# Patient Record
(demographics unavailable — no encounter records)

---

## 2019-03-11 NOTE — XMS REPORT
Patient Summary Document

 Created on:2019



Patient:BREANNA MONROE

Sex:Female

:1963

External Reference #:[id number]





Demographics







 Address  [address] [place] [address]

 

 Home Phone  [phone]

 

 Email Address  NONE

 

 Preferred Language  Unknown

 

 Marital Status  Unknown

 

 Islam Affiliation  Unknown

 

 Race  Unknown

 

 Additional Race(s)  Unavailable

 

 Ethnic Group  Unknown









Author







 Organization  [facility]

 

 Address  [address] [place] [address] Dr. Tucker [address] [place] [id number]

 

 Phone  [phone]









Care Team Providers







 Name  Role  Phone

 

 Unavailable  Unavailable  Unavailable









Problems

This patient has no known problems.



Allergies, Adverse Reactions, Alerts

This patient has no known allergies or adverse reactions.



Medications

This patient has no known medications.

## 2019-03-12 NOTE — RAD REPORT
EXAM DESCRIPTION:  RAD - Chest Pa And Lat (2 Views) - 3/12/2019 2:37 am

 

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
ack region

 

COMPARISON:  November 2015

 

TECHNIQUE:  PA and lateral views of the chest were obtained.

 

FINDINGS:  The lungs are slightly underinflated. There is focal density in the retrocardiac left base
. In the acute clinical setting this is most likely a small focal pneumonia. Mass lesion is lesser in
 likelihood but not excluded. No failure or volume overload.   Heart size is normal and central vascu
lature is within normal limits.  No pleural effusion or pneumothorax seen.  Focal sclerotic focus is 
seen superimposed on the metaphysis of the right humerus. This was not seen on prior imaging. The kaylen
ure of the patient's carcinoma is not defined to assess risk of a metastatic process. This could be a
 bone infarction. No aortic abnormality.

 

IMPRESSION:  Retrocardiac left base mass versus infiltrate. Correlation is needed with clinical findi
ngs for pneumonia.

 

Repeat imaging can be performed in 4-6 weeks following medical management for an acute respiratory pr
ocess. Alternatively, CT chest imaging could be performed for further clarification.

 

Rounded sclerotic focus proximal right humerus new from prior imaging. Bone infarct would be favored.
 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 Correlation is needed with t
he specific type of cancer to determine if the patient is at risk for bony metastatic disease.

## 2019-03-12 NOTE — RAD REPORT
EXAM DESCRIPTION:  CT - Head Brain Wo Cont - 3/12/2019 2:20 am

 

CLINICAL HISTORY:  Headache

 

COMPARISON:  None.

 

TECHNIQUE:  Axial 5 mm thick images of the head were obtained without IV contrast.

 

All CT scans are performed using dose optimization technique as appropriate and may include automated
 exposure control or mA/KV adjustment according to patient size.

 

FINDINGS:  No intracranial hemorrhage, mass, edema or shift of mid-line structures. No acute infarcti
on changes seen. No abnormal extra-axial fluid collections. Ventricles are normal.

 

Mastoid air cells are clear. There is extensive mucosal thickening throughout the right maxillary sin
us with air-fluid level. Ethmoid and sphenoid sinus mucosal thickening present also with air-fluid le
vels.

 

No acute bony findings.

 

Due to malfunction with the PACs/fluency system only verbal report could be provided at the time of t
he study.

 

IMPRESSION:  No intracranial abnormality identified.

 

Extensive acute sinusitis throughout most of the paranasal sinuses.

## 2019-03-12 NOTE — ER
Nurse's Notes                                                                                     

 [facility]                                                                

Name: Leonor Grace                                                                          

Age: 56 yrs                                                                                       

Sex: Female                                                                                       

: 1963                                                                                   

MRN: [id number]                                                                                   

Arrival Date: 2019                                                                          

Time: 22:13                                                                                       

Account#: [id number]                                                                            

Bed 26                                                                                            

Private MD:                                                                                       

Diagnosis: Chronic Sinusitis;headache;bronchitis;Hyperglycemia, unspecified                       

                                                                                                  

Presentation:                                                                                     

                                                                                             

22:17 Presenting complaint: Patient states: Reports headache and blurry vision that started a ea  

      week ago. Reports feeling tired and has been running fever for the past week. Daughter      

      reports she has the flu and was afraid mother caught it. Reports she has lost 50 lbs in     

      the past month. Transition of care: patient was not received from another setting of        

      care. No acute neurological deficit is noted. Onset of symptoms. Risk Assessment: Do        

      you want to hurt yourself or someone else? Patient reports no desire to harm self or        

      others. Initial Sepsis Screen: Does the patient meet any 2 criteria? No. Patient's          

      initial sepsis screen is negative. Does the patient have a suspected source of              

      infection? No. Patient's initial sepsis screen is negative. Care prior to arrival: None.    

22:17 Method Of Arrival: Ambulatory                                                           ea  

22:17 Acuity: MIGUELANGEL 3                                                                           ea  

                                                                                                  

Triage Assessment:                                                                                

22:24 General: Appears uncomfortable, Behavior is calm, cooperative, appropriate for age.     ea  

      Pain: Complains of pain in body aches. Neuro: Level of Consciousness is awake, alert,       

      obeys commands, Oriented to person, place, time, situation,  are equal bilaterally     

      Moves all extremities. Gait is steady, Speech is normal, Facial symmetry appears            

      normal, Intact Reports blurred vision headache. Respiratory: Airway is patent               

      Respiratory effort is even, unlabored, Respiratory pattern is regular, symmetrical.         

      Derm: Skin is pink, warm \T\ dry.                                                           

                                                                                                  

Historical:                                                                                       

- Allergies:                                                                                      

22:21 PENICILLINS;                                                                            ea  

- Home Meds:                                                                                      

22:21 None [Active];                                                                          ea  

- PMHx:                                                                                           

22:21 Diabetes - IDDM;                                                                        ea  

- PSHx:                                                                                           

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  

                                                                                                  

- Immunization history:: Adult Immunizations up to date.                                          

- Social history:: Smoking status: Patient uses tobacco products, smokes one pack                 

  cigarettes per day. The patient lives with family.                                              

- Ebola Screening: : No symptoms or risks identified at this time.                                

- Family history:: not pertinent.                                                                 

- Hospitalizations: : No recent hospitalization is reported.                                      

                                                                                                  

                                                                                                  

Screenin:21 VAN Screening: Arm Drift: Patient shows no arm weakness. Patient is VAN negative. The   ea  

      patient is alert, able to follow commands. The patient does not exhibit slurred or          

      garbled speech The patient is not exhibiting difficulty speaking. The patient does not      

      exhibit difficulty understanding words. The patient is able to swallow own secretions       

      with no drooling or need for suction. Patient tolerated one teaspoon of water. No           

      drooling, immediate coughing, gurgling, or clearing of the throat was noted. The            

      patient tolerated 90mL of water. No drooling, immediate coughing, gurgling, or clearing     

      of the throat was noted. The patient passed the bedside swallow screening. Oral             

      medications may be given as ordered. Contact Physician for further diet orders.             

                                                                                             

03:18 Abuse screen: Denies threats or abuse. Nutritional screening: No deficits noted.        ea  

      Tuberculosis screening: No symptoms or risk factors identified. Fall Risk None              

      identified.                                                                                 

                                                                                                  

Assessment:                                                                                       

00:10 General: Appears in no apparent distress. Behavior is calm, cooperative, Reports        fu  

      generalized body weakness for a month. Pain: Complains of pain in headache at scale of      

      8/10 Pain does not radiate. Neuro: Level of Consciousness is awake, alert, obeys            

      commands, Oriented to person, place, time, situation,  are equal bilaterally.          

      Cardiovascular: Denies chest pain. Respiratory: Reports cough that is productive, with      

      whitish sputum. Musculoskeletal: No signs and/or symptoms reported regarding the            

      musculoskeletal system.                                                                     

02:00 Reassessment: Patient appears in no apparent distress at this time. Patient and/or      fu  

      family updated on plan of care and expected duration. Pain level reassessed. Patient is     

      alert, oriented x 3, equal unlabored respirations, skin warm/dry/pink.                      

03:25 Reassessment: Patient appears in no apparent distress at this time. Patient and/or      fu  

      family updated on plan of care and expected duration. Pain level reassessed. Patient is     

      alert, oriented x 3, equal unlabored respirations, skin warm/dry/pink. Pain: Complains      

      of pain in head Pain does not radiate. Pain currently is 2 out of 10 on a pain scale.       

04:22 Reassessment: Patient appears in no apparent distress at this time. Patient and/or      fu  

      family updated on plan of care and expected duration. Pain level reassessed. Patient is     

      alert, oriented x 3, equal unlabored respirations, skin warm/dry/pink.                      

05:53 Reassessment: Patient appears in no apparent distress at this time. Patient and/or      fu  

      family updated on plan of care and expected duration. Pain level reassessed. Patient is     

      alert, oriented x 3, equal unlabored respirations, skin warm/dry/pink. Patient states       

      feeling better.                                                                             

                                                                                                  

Vital Signs:                                                                                      

                                                                                             

22:30  / 67; Pulse 96; Resp 18; Temp 97.7; Pulse Ox 100% on R/A; Weight 77.11 kg;       ea  

      Height 5 ft. 4 in. (162.56 cm);                                                             

                                                                                             

01:00  / 70; Pulse 88; Pulse Ox 95% ; Pain 8/10;                                        fu  

01:45  / 57; Pulse 83; Pulse Ox 94% ; Pain 4/10;                                        fu  

03:15  / 65; Pulse 90; Temp 98.2; Pulse Ox 91% ; Pain 2/10;                             fu  

04:00  / 62; Pulse 90; Pulse Ox 96% ; Pain 2/10;                                        fu  

04:58  / 59; Pulse 76; Resp 13; Pulse Ox 94% ;                                          fu  

05:53  / 59; Pulse 69; Resp 13; Pulse Ox 95% ; Pain 0/10;                               fu  

                                                                                             

22:30 Body Mass Index 29.18 (77.11 kg, 162.56 cm)                                               

                                                                                                  

ED Course:                                                                                        

                                                                                             

22:13 Patient arrived in ED.                                                                  am2 

22:19 Triage completed.                                                                         

                                                                                             

01:04 Adal Patterson MD is Attending Physician.                                             wa  

01:10 Vitor Morillo, RN is Primary Nurse.                                                    fu  

01:10 Arm band placed on right wrist. Patient placed in an exam room, on a stretcher, on      ea  

      pulse oximetry.                                                                             

01:45 Inserted saline lock: 20 gauge in left antecubital area, using aseptic technique. Blood fu  

      collected.                                                                                  

02:09 CT Head Brain wo Cont In Process Unspecified.                                           EDMS

02:38 XRAY Chest Pa And Lat (2 Views) In Process Unspecified.                                 EDMS

04:24 Lab(s) recollected, sent to lab.                                                        fu  

04:30 Patient has correct armband on for positive identification. Bed in low position. Call   fu  

      light in reach. Side rails up X2. Cardiac monitor on. Pulse ox on. NIBP on.                 

04:58 No apparent distress. Appears to be sleeping.                                           fu  

05:53 Patient requests rest room assistance.                                                  fu  

06:23 No provider procedures requiring assistance completed. IV discontinued, bleeding        fu  

      controlled.                                                                                 

                                                                                                  

Administered Medications:                                                                         

02:40 Drug: NS 0.9% 1000 ml Route: IV; Rate: 1 bolus; Site: left antecubital;                 fu  

02:40 Drug: Zofran 4 mg Route: IVP; Site: left antecubital;                                   fu  

02:40 Drug: Tylenol 1000 mg Route: PO;                                                        fu  

02:41 Drug: Xopenex 1.25 mg Route: Inhalation;                                                fu  

06:02 CANCELLED (Physician Discretion): Zithromax 500 mg IVPB once over 1 hrs; mix in 250 mL  wa  

      NS                                                                                          

06:12 Drug: Zithromax 500 mg Route: PO;                                                       ea  

06:15 Drug: Rocephin - (cefTRIAXone) 2 grams Route: IVPB; Infused Over: 30 mins; Site: left   fu  

      antecubital;                                                                                

                                                                                                  

                                                                                                  

Point of Care Testing:                                                                            

      Blood Glucose:                                                                              

                                                                                             

22:23 Blood Glucose: 365 mg/dL;                                                               ea  

      Ranges:                                                                                     

                                                                                                  

Outcome:                                                                                          

                                                                                             

06:23 Discharge ordered by MD.                                                                wa  

06:36 Discharged to home ambulatory.                                                          fu  

06:36 Condition: improved                                                                         

06:36 Discharge instructions given to patient, Instructed on discharge instructions, follow       

      up and referral plans. Demonstrated understanding of instructions, follow-up care,          

      medications, Prescriptions given X 4.                                                       

06:45 Patient left the ED.                                                                    fu  

                                                                                                  

Signatures:                                                                                       

Dispatcher [employer]                           EDMS                                                 

Laurie Gonslaez                               am2                                                  

Verito Eng RN RN ea Appiah, William, MD MD wa Umadhay, Felix, RN                      RN   fu                                                   

                                                                                                  

Corrections: (The following items were deleted from the chart)                                    

                                                                                             

22:23 22:17 Presenting complaint: Patient states: Reports she lost 50 lbs in a month. Reports ea  

      headache and blurry vision that started a week ago. ea                                      

                                                                                             

03:21 03:16 General: Appears in no apparent distress. Behavior is calm, cooperative, Reports  fu  

      generalized body weakness for a month. fu                                                   

03:24 03:16 Neuro: Level of Consciousness is awake, alert, obeys commands, Oriented to        fu  

      person, place, time, situation,  are equal bilaterally fu                              

03:24 03:16 Pain: Complains of pain in headache at scale of 8/10 Pain does not radiate. fu    fu  

 03:16 Cardiovascular: Denies chest pain, fu                                             fu  

 03:16 Respiratory: Reports cough that is productive, with whitish sputum. fu            fu  

 03:16 Musculoskeletal: No signs and/or symptoms reported regarding the musculoskeletal  fu  

      system. fu                                                                                  

 03:16 General: Appears in no apparent distress. Behavior is calm, cooperative, Reports  fu  

      generalized body weakness for a month. fu                                                   

                                                                                                  

**************************************************************************************************

## 2019-03-12 NOTE — EDPHYS
Physician Documentation                                                                           

 [facility]                                                                

Name: Leonor Grace                                                                          

Age: 56 yrs                                                                                       

Sex: Female                                                                                       

: 1963                                                                                   

MRN: [id number]                                                                                   

Arrival Date: 2019                                                                          

Time: 22:13                                                                                       

Account#: [id number]                                                                            

Bed 26                                                                                            

Private MD:                                                                                       

ED Physician Adal Patterson                                                                      

HPI:                                                                                              

                                                                                             

03:59 This 56 yrs old  Female presents to ER via Ambulatory with complaints of       wa  

      posterior HA, blurry vision.                                                                

03:59 The patient's problem is reported as c/o HAs at back of head x 1 week. also blurry      wa  

      vision. sinus pain and congestion. also cough x 1 month now with sputum. states feeling     

      listless and cannot play with grand kids any longer. denies chest pain. states SOB when     

      coughing spells. denies abd pain. Onset: The symptoms/episode began/occurred 4 month(s)     

      ago. Duration: The episode is continuous, wax and wane. Context: as noted above. The        

      symptoms are alleviated by nothing. The symptoms are aggravated by nothing. Associated      

      signs and symptoms: Pertinent positives: blurred vision, cough. . Severity of symptoms:     

      At their worst the symptoms were moderate in the emergency department the symptoms are      

      unchanged. Patient's baseline: Neuro: alert and fully oriented, Motor: no deficits,         

      Ambulation: walks without assistance, Speech: normal, The patient has a previous            

      history of diabetes. states controls with diet as cannot afford metformin. The patient      

      has not experienced similar symptoms in the past. The patient has not recently seen a       

      physician.                                                                                  

                                                                                                  

Historical:                                                                                       

- Allergies:                                                                                      

                                                                                             

22:21 PENICILLINS;                                                                            ea  

- Home Meds:                                                                                      

22:21 None [Active];                                                                          ea  

- PMHx:                                                                                           

22:21 Diabetes - IDDM;                                                                        ea  

- PSHx:                                                                                           

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  

                                                                                                  

- Immunization history:: Adult Immunizations up to date.                                          

- Social history:: Smoking status: Patient uses tobacco products, smokes one pack                 

  cigarettes per day. The patient lives with family.                                              

- Ebola Screening: : No symptoms or risks identified at this time.                                

- Family history:: not pertinent.                                                                 

- Hospitalizations: : No recent hospitalization is reported.                                      

                                                                                                  

                                                                                                  

ROS:                                                                                              

                                                                                             

04:04 Constitutional: Negative for fever, chills, and weight loss, Eyes: Negative for injury, wa  

      pain, redness, and discharge, ENT: Negative for injury, pain, and discharge, Neck:          

      Negative for injury, pain, and swelling, Abdomen/GI: Negative for abdominal pain,           

      nausea, vomiting, diarrhea, and constipation, Back: Negative for injury and pain, :       

      Negative for injury, bleeding, discharge, and swelling, MS/Extremity: Negative for          

      injury and deformity, Skin: Negative for injury, rash, and discoloration, Psych:            

      Negative for depression, anxiety, suicide ideation, homicidal ideation, and                 

      hallucinations.                                                                             

      Cardiovascular: Negative for chest pain, edema, orthopnea, palpitations.                    

      Respiratory: Positive for cough, with white sputum.                                         

      Neuro: Positive for headache, Negative for loss of consciousness, syncope.                  

      All other systems are negative.                                                             

                                                                                                  

Exam:                                                                                             

04:04 Radiologist reports: negative for intracranial bleed. noted sinus disease               wa  

04:04 Head/Face:  Normocephalic, atraumatic. Eyes:  Pupils equal round and reactive to light,     

      extra-ocular motions intact.  Lids and lashes normal.  Conjunctiva and sclera are           

      non-icteric and not injected.  Cornea within normal limits.  Periorbital areas with no      

      swelling, redness, or edema. ENT:  Nares patent. No nasal discharge, no septal              

      abnormalities noted.  Tympanic membranes are normal and external auditory canals are        

      clear.  Oropharynx with no redness, swelling, or masses, exudates, or evidence of           

      obstruction, uvula midline.  Mucous membranes moist. Neck:  Trachea midline, no             

      thyromegaly or masses palpated, and no cervical lymphadenopathy.  Supple, full range of     

      motion without nuchal rigidity, or vertebral point tenderness.  No Meningismus.             

      Chest/axilla:  Normal chest wall appearance and motion.  Nontender with no deformity.       

      No lesions are appreciated. Abdomen/GI:  Soft, non-tender, with normal bowel sounds.        

      No distension or tympany.  No guarding or rebound.  No evidence of tenderness               

      throughout. Back:  No spinal tenderness.  No costovertebral tenderness.  Full range of      

      motion. Skin:  Warm, dry with normal turgor.  Normal color with no rashes, no lesions,      

      and no evidence of cellulitis. MS/ Extremity:  Pulses equal, no cyanosis.                   

      Neurovascular intact.  Full, normal range of motion. Psych:  Awake, alert, with             

      orientation to person, place and time.  Behavior, mood, and affect are within normal        

      limits.                                                                                     

04:04 Cardiovascular: Rate: normal, Rhythm: regular, Pulses: no pulse deficits are                

      appreciated, Heart sounds: normal, Edema: is not appreciated, JVD: is not appreciated.      

04:04 Respiratory: the patient does not display signs of respiratory distress,  Respirations:     

      normal, Breath sounds: wheezing: is scattered, Respiratory rate:  normal                    

04:04 Neuro: Orientation: is normal, Mentation: is normal, Cranial nerves: grossly normal,        

      Cerebellar function: is grossly normal, Motor: is normal.                                   

                                                                                                  

Vital Signs:                                                                                      

                                                                                             

22:30  / 67; Pulse 96; Resp 18; Temp 97.7; Pulse Ox 100% on R/A; Weight 77.11 kg;       ea  

      Height 5 ft. 4 in. (162.56 cm);                                                             

                                                                                             

01:00  / 70; Pulse 88; Pulse Ox 95% ; Pain 8/10;                                        fu  

01:45  / 57; Pulse 83; Pulse Ox 94% ; Pain 4/10;                                        fu  

03:15  / 65; Pulse 90; Temp 98.2; Pulse Ox 91% ; Pain 2/10;                             fu  

04:00  / 62; Pulse 90; Pulse Ox 96% ; Pain 2/10;                                        fu  

04:58  / 59; Pulse 76; Resp 13; Pulse Ox 94% ;                                          fu  

05:53  / 59; Pulse 69; Resp 13; Pulse Ox 95% ; Pain 0/10;                               fu  

                                                                                             

22:30 Body Mass Index 29.18 (77.11 kg, 162.56 cm)                                               

                                                                                                  

MDM:                                                                                              

01:04 Patient medically screened.                                                             wa  

04:06 Differential diagnosis: CVA, sinusitis? check labs, CT CXR, r/o pna. Data reviewed:     wa  

      vital signs, nurses notes.                                                                  

06:20 Test interpretation: by ED physician or midlevel provider: labs: glucose elevated at    wa  

      412. wbc 15.4.                                                                              

06:21 Test interpretation: by ED physician or midlevel provider: head CT: no acute bleed.     wa  

      noted pan sinus disease.                                                                    

06:28 Test interpretation: by ED physician or midlevel provider: EKG: HR 85. noted            wa  

      inferior-lateral ST depressions. . Response to treatment: the patient's symptoms have       

      markedly improved after treatment.                                                          

                                                                                                  

                                                                                             

22:24 Order name: Flu; Complete Time: 01:06                                                   ea  

                                                                                             

01:40 Order name: BMP; Complete Time: 05:58                                                   wa  

                                                                                             

01:40 Order name: CBC with Diff; Complete Time: 05:58                                         wa  

                                                                                             

01:40 Order name: Hepatic Function; Complete Time: 05:58                                      wa  

                                                                                             

01:40 Order name: Lipase; Complete Time: 05:58                                                wa  

                                                                                             

01:40 Order name: Magnesium; Complete Time: 05:58                                             wa  

                                                                                             

01:40 Order name: XRAY Chest Pa And Lat (2 Views)                                                                                                                                          

01:40 Order name: NT PRO-BNP; Complete Time: 05:58                                            wa  

                                                                                             

01:40 Order name: PT-INR; Complete Time: 05:58                                                wa  

                                                                                             

01:40 Order name: Troponin (emerg Dept Use Only); Complete Time: 05:58                        wa  

                                                                                             

01:41 Order name: CT Head Brain wo Cont                                                                                                                                                    

01:40 Order name: EKG; Complete Time: 01:40                                                   wa  

                                                                                             

01:40 Order name: Cardiac monitoring; Complete Time: 03:13                                    wa  

                                                                                             

01:40 Order name: EKG - Nurse/Tech; Complete Time: 03:13                                      wa  

                                                                                             

01:40 Order name: IV Saline Lock; Complete Time: 02:49                                        wa  

                                                                                             

01:40 Order name: Labs collected and sent; Complete Time: 03:13                                                                                                                            

01:40 Order name: O2 Per Protocol; Complete Time: 03:13                                                                                                                                    

01:40 Order name: O2 Sat Monitoring; Complete Time: 03:                                     wa  

                                                                                             

04:11 Order name: Labs - recollect needed; Complete Time: 04:29                               ms  

                                                                                                  

Administered Medications:                                                                         

02:40 Drug: NS 0.9% 1000 ml Route: IV; Rate: 1 bolus; Site: left antecubital;                 fu  

02:40 Drug: Zofran 4 mg Route: IVP; Site: left antecubital;                                   fu  

02:40 Drug: Tylenol 1000 mg Route: PO;                                                        fu  

02:41 Drug: Xopenex 1.25 mg Route: Inhalation;                                                fu  

06:02 CANCELLED (Physician Discretion): Zithromax 500 mg IVPB once over 1 hrs; mix in 250 mL  wa  

      NS                                                                                          

06:12 Drug: Zithromax 500 mg Route: PO;                                                       ea  

06:15 Drug: Rocephin - (cefTRIAXone) 2 grams Route: IVPB; Infused Over: 30 mins; Site: left   fu  

      antecubital;                                                                                

                                                                                                  

                                                                                                  

Point of Care Testing:                                                                            

      Blood Glucose:                                                                              

                                                                                             

22:23 Blood Glucose: 365 mg/dL;                                                               ea  

      Ranges:                                                                                     

      Critical Glucose Levels:Adult <50 mg/dl or >400 mg/dl  <40 mg/dl or >180 mg/dl       

Disposition:                                                                                      

19 06:23 Discharged to Home. Impression: Chronic Sinusitis, headache, bronchitis,           

  Hyperglycemia, unspecified.                                                                     

- Condition is Stable.                                                                            

- Discharge Instructions: Sinusitis, Adult, Easy-to-Read, Acute Bronchitis,                       

  Easy-to-Read.                                                                                   

- Prescriptions for Zithromax Z- Randy 250 mg Oral Tablet - take 1 tablet by ORAL route             

  as directed for 5 days Day 1 - take two (2) tablets one time. Day 2, 3, 4 , 5 take              

  one (1) tablet once daily.; 6 tablet. Albuterol Sulfate 90 mcg/actuation - inhale 1-2           

  puff by INHALATION route every 4-6 hours; 1 Inhaler. Diflucan 150 mg Oral Tablet -              

  take 1 tablet by ORAL route one time for 1 day; 1 tablet. Metformin 500 mg Oral                 

  Tablet - take 1 tablet by ORAL route 2 times per day for 30 days Then take 1 tablet             

  with morning meals AND evening meals; 60 tablet.                                                

- Medication Reconciliation Form, Thank You Letter, Antibiotic Education, Prescription            

  Opioid Use form.                                                                                

- Follow up: Private Physician; When: 2 - 3 days; Reason: Recheck today's complaints.             

- Problem is new.                                                                                 

- Symptoms have improved.                                                                         

                                                                                                  

                                                                                                  

                                                                                                  

Signatures:                                                                                       

Dispatcher [employer]                           EDMS                                                 

Sandra Mercado ms, Elena, RN RN ea [place], William, MD MD wa Umadhay, Felix, RN RN fu                                                   

                                                                                                  

Corrections: (The following items were deleted from the chart)                                    

                                                                                             

06:02 06:01 Zithromax 500 mg IVPB once over 1 hrs; mix in 250 mL NS ordered. [facility]  

06:26 06:23 2019 06:23 Discharged to Home. Impression: Chronic Sinusitis; headache;     wa  

      bronchitis. Condition is Stable. Forms are Medication Reconciliation Form, Thank You        

      Letter, Antibiotic Education, Prescription Opioid Use. Follow up: Private Physician;        

      When: 2 - 3 days; Reason: Recheck today's complaints. Problem is new. Symptoms have         

      improved. wa                                                                                

06:45 06:26 2019 06:23 Discharged to Home. Impression: Chronic Sinusitis; headache;     fu  

      bronchitis; Hyperglycemia, unspecified. Condition is Stable. Discharge Instructions:        

      Sinusitis, Adult, Easy-to-Read, Acute Bronchitis, Easy-to-Read. Prescriptions for           

      Zithromax Z-Randy 250 mg Oral Tablet - take 1 tablet by ORAL route as directed for 5 days     

      Day 1 - take two (2) tablets one time. Day 2, 3, 4 , 5 take one (1) tablet once daily.;     

      6 tablet, Albuterol Sulfate 90 mcg/actuation - inhale 1-2 puff by INHALATION route          

      every 4-6 hours; 1 Inhaler, Diflucan 150 mg Oral Tablet - take 1 tablet by ORAL route       

      one time for 1 day; 1 tablet, Metformin 500 mg Oral Tablet - take 1 tablet by ORAL          

      route 2 times per day for 30 days Then take 1 tablet with morning meals AND evening         

      meals; 60 tablet. and Forms are Medication Reconciliation Form, Thank You Letter,           

      Antibiotic Education, Prescription Opioid Use. Follow up: Private Physician; When: 2 -      

      3 days; Reason: Recheck today's complaints. Problem is new. Symptoms have improved. wa      

                                                                                                  

**************************************************************************************************

## 2019-03-14 NOTE — EKG
Test Date:    2019-03-12               Test Time:    03:00:44

Technician:   SHAUNA                                     

                                                     

MEASUREMENT RESULTS:                                       

Intervals:                                           

Rate:         85                                     

OH:           146                                    

QRSD:         78                                     

QT:           352                                    

QTc:          418                                    

Axis:                                                

P:            72                                     

OH:           146                                    

QRS:          71                                     

T:            -42                                    

                                                     

INTERPRETIVE STATEMENTS:                                       

                                                     

Normal sinus rhythm

T wave abnormality, consider inferior ischemia

Abnormal ECG

Compared to ECG 11/09/2015 10:38:38

T-wave abnormality now present

Possible ischemia now present

ST (T wave) deviation no longer present



Electronically Signed On 03-12-19 12:40:20 CDT by Rene Minor

## 2019-06-10 NOTE — EDPHYS
Physician Documentation                                                                           

 [facility]                                                                 

Name: Leonor Grace                                                                          

Age: 56 yrs                                                                                       

Sex: Female                                                                                       

: 1963                                                                                   

MRN: [id number]                                                                                   

Arrival Date: 06/10/2019                                                                          

Time: 10:17                                                                                       

Account#: [id number]                                                                            

Bed 7                                                                                             

Private MD:                                                                                       

ED Physician Benny Durham                                                                      

HPI:                                                                                              

06/10                                                                                             

10:57 This 56 yrs old  Female presents to ER via Ambulatory with complaints of Chest ps1 

      Pain, Breathing Difficulty.                                                                 

10:57 patient has a history of DM and Tobacco use. States that she started having chest pain  ps1 

      yesterday that is localized substernal with radiation to back. Describes it as burning      

      indigestion like and has taken antacids without relief. Pain is moderate. In waves. No      

      SOB. No diaphoresis. No cards eval previously. .                                            

                                                                                                  

Historical:                                                                                       

- Allergies:                                                                                      

10:24 PENICILLINS;                                                                            aj  

- Home Meds:                                                                                      

13:41 metformin 500 mg Oral tab 1 tab 2 times per day [Active];                               ph  

- PSHx:                                                                                           

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  

                                                                                                  

- Immunization history:: Adult Immunizations unknown.                                             

- Social history:: Smoking status: Patient uses tobacco products, smokes one-half pack            

  cigarettes per day.                                                                             

- Ebola Screening: : No symptoms or risks identified at this time.                                

                                                                                                  

                                                                                                  

ROS:                                                                                              

10:57 Constitutional: Negative for fever, chills, and weight loss, Eyes: Negative for injury, ps1 

      pain, redness, and discharge, ENT: Negative for injury, pain, and discharge,                

      Respiratory: Negative for shortness of breath, cough, wheezing, and pleuritic chest         

      pain, Abdomen/GI: Negative for abdominal pain, nausea, vomiting, diarrhea, and              

      constipation, MS/Extremity: Negative for injury and deformity, Skin: Negative for           

      injury, rash, and discoloration, Neuro: Negative for headache, weakness, numbness,          

      tingling, and seizure.                                                                      

10:57 Cardiovascular: Positive for chest pain.                                                    

                                                                                                  

Exam:                                                                                             

10:57 Constitutional:  This is a well developed, well nourished patient who is awake, alert,  ps1 

      and in no acute distress. Head/Face:  Normocephalic, atraumatic. Eyes:  Pupils equal        

      round and reactive to light, extra-ocular motions intact.  Lids and lashes normal.          

      Conjunctiva and sclera are non-icteric and not injected. ENT:  Nares patent. No nasal       

      discharge, no septal abnormalities noted.  Tympanic membranes are normal and external       

      auditory canals are clear.  Oropharynx with no redness, swelling, or masses, exudates,      

      or evidence of obstruction, uvula midline.  Mucous membranes moist. Chest/axilla:           

      Normal chest wall appearance and motion.  Nontender with no deformity.  No lesions are      

      appreciated. Cardiovascular:  Regular rate and rhythm.  No gallops, murmurs, or rubs.       

      Normal PMI, no JVD.  No pulse deficits. Respiratory:  Lungs have equal breath sounds        

      bilaterally, clear to auscultation and percussion.  No rales, rhonchi or wheezes noted.     

       No increased work of breathing, no retractions or nasal flaring. Abdomen/GI:  Soft,        

      non-tender, with normal bowel sounds.  No distension or tympany.  No guarding or            

      rebound.  No evidence of tenderness throughout. Skin:  Warm, dry with normal turgor.        

      Normal color with no rashes, no lesions, and no evidence of cellulitis. MS/ Extremity:      

      Pulses equal, no cyanosis.  Neurovascular intact.  Full, normal range of motion. Neuro:     

       Awake and alert, GCS 15, oriented to person, place, time, and situation.  Cranial          

      nerves II-XII grossly intact. Sensory grossly intact.                                       

                                                                                                  

Vital Signs:                                                                                      

10:24  / 80; Pulse 100; Resp 20; Temp 97.8; Pulse Ox 99% on R/A; Weight 69.85 kg;       aj  

      Height 5 ft. 4 in. (162.56 cm);                                                             

11:45  / 86; Pulse 86; Resp 18; Pulse Ox 98% on R/A;                                    ph  

12:45  / 72; Pulse 87; Resp 18; Pulse Ox 99% on R/A;                                    ph  

13:42  / 65; Pulse 89; Resp 18; Pulse Ox 99% on R/A;                                    ph  

15:19  / 78; Pulse 82; Resp 18; Temp 97.5; Pulse Ox 99% on R/A;                         ph  

10:24 Body Mass Index 26.43 (69.85 kg, 162.56 cm)                                             aj  

                                                                                                  

MDM:                                                                                              

10:38 Patient medically screened.                                                             ps1 

13:27 HEART Score: History: Moderately Suspicious (1), ECG: Significant ST-deviation (2),     ps1 

      Age: > 45 and < 65 years (1), Risk Factors: 1 or 2 risk factors (1), Troponin: < or = 1     

      x Normal Limit (0). Data reviewed: vital signs, nurses notes, lab test result(s), EKG,      

      radiologic studies, and as a result, I will continue to observe the patient.                

                                                                                                  

06/10                                                                                             

10:39 Order name: CBC with Diff                                                               ps1 

06/10                                                                                             

10:39 Order name: Magnesium                                                                   ps1 

06/10                                                                                             

10:39 Order name: NT PRO-BNP                                                                  ps1 

06/10                                                                                             

10:39 Order name: Troponin (emerg Dept Use Only)                                              ps1 

06/10                                                                                             

10:39 Order name: CMP                                                                         ps1 

06/10                                                                                             

12:59 Order name: CBC with Automated Diff                                                     EDMS

06/10                                                                                             

10:39 Order name: XRAY Chest (1 view); Complete Time: 13:16                                   ps1 

06/10                                                                                             

13:00 Order name: Comprehensive Metabolic Panel                                               EDMS

06/10                                                                                             

13:00 Order name: Troponin (Emerg Dept Use Only)                                              EDMS

06/10                                                                                             

13:00 Order name: NT PRO-BNP                                                                  EDMS

06/10                                                                                             

13:00 Order name: Magnesium                                                                   EDMS

06/10                                                                                             

10:39 Order name: EKG; Complete Time: 12:55                                                   ps1 

06/10                                                                                             

10:39 Order name: Cardiac monitoring; Complete Time: 11:21                                    ps1 

06/10                                                                                             

10:39 Order name: EKG - Nurse/Tech; Complete Time: 11:21                                      ps1 

06/10                                                                                             

10:39 Order name: IV Saline Lock; Complete Time: 11:31                                        ps1 

06/10                                                                                             

10:39 Order name: Labs collected and sent; Complete Time: 11:31                               ps1 

06/10                                                                                             

10:39 Order name: O2 Per Protocol; Complete Time: 11:31                                       ps1 

06/10                                                                                             

10:39 Order name: O2 Sat Monitoring; Complete Time: 11:30                                     ps1 

                                                                                                  

ECG:                                                                                              

10:29 Rate is 97 beats/min. Rhythm is regular. QRS Axis is Normal. DC interval is normal. QRS ps1 

      interval is normal. QT interval is normal. No Q waves. T waves are Inverted. ST Segment     

      is depressed in leads V4, V5, V6.                                                           

                                                                                                  

Administered Medications:                                                                         

11:03 Drug: Zofran 4 mg Route: IVP; Site: left antecubital;                                   ph  

14:20 Follow up: Response: No adverse reaction                                                ph  

11:05 Drug: morphine 4 mg Route: IVP; Site: left antecubital;                                 ph  

11:20 Follow up: Response: No adverse reaction; Pain is decreased                             ph  

13:32 Drug: GI Cocktail without Donnatal - (Maalox Suspension 30 ml, Lidocaine Liquid 2 % 15  ph  

      ml) Route: PO;                                                                              

14:19 Follow up: Response: No adverse reaction                                                ph  

                                                                                                  

                                                                                                  

Disposition:                                                                                      

06/10/19 13:26 Hospitalization ordered by Zion Estes for Observation. Preliminary             

  diagnosis are Chest pain, unspecified, Abnormal electrocardiogram [ECG] [EKG].                  

- Bed requested for Telemetry/MedSurg (observation).                                              

- Status is Observation.                                                                      sg  

- Condition is Stable.                                                                            

- Problem is new.                                                                                 

- Symptoms are unchanged.                                                                         

UTI on Admission? No                                                                              

                                                                                                  

                                                                                                  

                                                                                                  

Signatures:                                                                                       

Dispatcher [employer]                           EDMS                                                 

Marlyn Lara Diana RN                        Deepak Andersen RN RN sg Myers, Amanda, RN RN aj Hall, Patricia, RN RN ph Singer, Phillip, MD MD   ps1                                                  

                                                                                                  

Corrections: (The following items were deleted from the chart)                                    

13:24 13:21 06/10/2019 13:21 Discharged to Home. Impression: Chest pain, unspecified;         ps1 

      Gastro-esophageal reflux disease with esophagitis. Condition is Stable. Forms are           

      Medication Reconciliation Form, Thank You Letter, Antibiotic Education, Prescription        

      Opioid Use. Follow up: Rene Minor; When: Upon discharge from the Emergency                

      Department; Reason: Further diagnostic work-up, Recheck today's complaints, Continuance     

      of care. Follow up: Emergency Department; When: As needed; Reason: Trouble breathing,       

      Worsening of condition. Problem is new. Symptoms have improved. ps1                         

14:51 13:26 Hospitalization Ordered by Zion Estes DO for Observation. Preliminary          bd  

      diagnosis is Chest pain, unspecified; Abnormal electrocardiogram [ECG] [EKG]. Bed           

      requested for Telemetry/MedSurg (observation). Status is Observation. Condition is          

      Stable. Problem is new. Symptoms are unchanged. UTI on Admission? No. ps1                   

15:06 14:51 06/10/2019 13:26 Hospitalization Ordered by Zion Estes DO for Observation.     dw  

      Preliminary diagnosis is Chest pain, unspecified; Abnormal electrocardiogram [ECG]          

      [EKG]. Bed requested for Telemetry/MedSurg (observation). Status is Observation.            

      Condition is Stable. Problem is new. Symptoms are unchanged. UTI on Admission? No. bd       

16:18 15:06 06/10/2019 13:26 Hospitalization Ordered by Zion Prezas DO for Observation.     sg  

      Preliminary diagnosis is Chest pain, unspecified; Abnormal electrocardiogram [ECG]          

      [EKG]. Bed requested for Telemetry/MedSurg (observation). Status is Observation.            

      Condition is Stable. Problem is new. Symptoms are unchanged. UTI on Admission? No. dw       

                                                                                                  

**************************************************************************************************
None

## 2019-06-10 NOTE — RAD REPORT
EXAM DESCRIPTION:  Srikanth Single View6/10/2019 11:48 am

 

CLINICAL HISTORY:  Chest pain

 

COMPARISON:  March 2019

 

FINDINGS:   The lungs appear clear of acute infiltrate. The heart is normal size

 

IMPRESSION:   No acute abnormalities displayed

## 2019-06-10 NOTE — EKG
Test Date:    2019-06-10               Test Time:    10:29:56

Technician:   MATTHIAS                                     

                                                     

MEASUREMENT RESULTS:                                       

Intervals:                                           

Rate:         97                                     

VT:           124                                    

QRSD:         86                                     

QT:           346                                    

QTc:          439                                    

Axis:                                                

P:            60                                     

VT:           124                                    

QRS:          64                                     

T:            -7                                     

                                                     

INTERPRETIVE STATEMENTS:                                       

                                                     

Normal sinus rhythm

Right atrial enlargement

ST abnormality, non specific

Abnormal ECG

Compared to ECG 03/12/2019 03:00:44

Atrial abnormality now present



Electronically Signed On 06-10-19 17:18:28 CDT by Rene Minor

## 2019-06-10 NOTE — ER
Nurse's Notes                                                                                     

 [facility]                                                                 

Name: Leonor Grace                                                                          

Age: 56 yrs                                                                                       

Sex: Female                                                                                       

: 1963                                                                                   

MRN: [id number]                                                                                   

Arrival Date: 06/10/2019                                                                          

Time: 10:17                                                                                       

Account#: [id number]                                                                            

Bed 7                                                                                             

Private MD:                                                                                       

Diagnosis: Chest pain, unspecified;Abnormal electrocardiogram [ECG] [EKG]                         

                                                                                                  

Presentation:                                                                                     

06/10                                                                                             

10:23 Presenting complaint: Patient states: Epigastric pain with SOB that started last night. aj  

      Patient is in NAD at this time. Transition of care: patient was not received from           

      another setting of care. Onset of symptoms was Cydney 10, 2019. Risk Assessment: Do you       

      want to hurt yourself or someone else? Patient reports no desire to harm self or            

      others. Initial Sepsis Screen: Does the patient meet any 2 criteria? No. Patient's          

      initial sepsis screen is negative. Does the patient have a suspected source of              

      infection? No. Patient's initial sepsis screen is negative. Care prior to arrival: None.    

10:23 Method Of Arrival: Ambulatory                                                             

10:23 Acuity: MIGUELANGEL 2                                                                           aj  

                                                                                                  

Triage Assessment:                                                                                

10:24 General: Appears in no apparent distress. uncomfortable, Behavior is calm, cooperative, aj  

      appropriate for age. Pain: Complains of pain in diaphragm, xyphoid area and epigastric      

      area. Neuro: Level of Consciousness is awake, alert, obeys commands, Oriented to            

      person, place, time, situation, Appropriate for age. Cardiovascular: Reports chest          

      pain, Capillary refill < 3 seconds in bilateral fingers. Respiratory: Reports shortness     

      of breath. Derm: Skin is intact, is healthy with good turgor, Skin is pink, warm \T\ dry.   

      normal.                                                                                     

                                                                                                  

Historical:                                                                                       

- Allergies:                                                                                      

10:24 PENICILLINS;                                                                            aj  

- Home Meds:                                                                                      

13:41 metformin 500 mg Oral tab 1 tab 2 times per day [Active];                               ph  

- PSHx:                                                                                           

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  

                                                                                                  

- Immunization history:: Adult Immunizations unknown.                                             

- Social history:: Smoking status: Patient uses tobacco products, smokes one-half pack            

  cigarettes per day.                                                                             

- Ebola Screening: : No symptoms or risks identified at this time.                                

                                                                                                  

                                                                                                  

Screenin:34 Abuse screen: Denies threats or abuse. Denies injuries from another. Nutritional        ph  

      screening: No deficits noted. Tuberculosis screening: No symptoms or risk factors           

      identified. Fall Risk None identified.                                                      

                                                                                                  

Assessment:                                                                                       

11:47 General: Appears in no apparent distress. uncomfortable, Behavior is calm, cooperative, ph  

      appropriate for age, Denies fever, feeling ill. Pain: Complains of pain in xyphoid area     

      and mid-sternal area Pain radiates to diaphragm and back Quality of pain is described       

      as burning, sharp, Pain began 1 day ago. Neuro: Level of Consciousness is awake, alert,     

      obeys commands, Oriented to person, place, time, situation. Cardiovascular: Reports         

      chest pain, nausea, Denies palpitations, shortness of breath, syncope, vomiting.            

      Respiratory: Airway is patent Respiratory effort is even, unlabored, Respiratory            

      pattern is regular, symmetrical. GI: Abdomen is round non-distended, Reports epigastric     

      pain, nausea, Patient currently denies diarrhea, vomiting. Derm: Skin is intact, is         

      healthy with good turgor, Skin is pink, warm \T\ dry. Musculoskeletal: Circulation,         

      motion, and sensation intact. Range of motion: intact in all extremities.                   

11:53 Reassessment: Patient appears in no apparent distress at this time. Patient and/or      ph  

      family updated on plan of care and expected duration. Pain level reassessed. Patient is     

      alert, oriented x 3, equal unlabored respirations, skin warm/dry/pink. Pt reports that      

      pain has decreased after IV pain medication, VSS, awaiting lab results.                     

13:15 Reassessment: Patient appears in no apparent distress at this time. Patient and/or      ph  

      family updated on plan of care and expected duration. Pain level reassessed. Patient is     

      alert, oriented x 3, equal unlabored respirations, skin warm/dry/pink. Pt reports that      

      pain has returned, denies nausea, see MAR.                                                  

13:43 Reassessment: Patient appears in no apparent distress at this time. Dr Estes at          

      bedside to speak w/ pt.                                                                     

15:15 Reassessment: Patient appears in no apparent distress at this time. Patient and/or      ph  

      family updated on plan of care and expected duration. Pain level reassessed. Patient is     

      alert, oriented x 3, equal unlabored respirations, skin warm/dry/pink. Attempted to         

      call report to 2nd floor, receiving nurse unavailable.                                      

                                                                                                  

Vital Signs:                                                                                      

10:24  / 80; Pulse 100; Resp 20; Temp 97.8; Pulse Ox 99% on R/A; Weight 69.85 kg;       aj  

      Height 5 ft. 4 in. (162.56 cm);                                                             

11:45  / 86; Pulse 86; Resp 18; Pulse Ox 98% on R/A;                                    ph  

12:45  / 72; Pulse 87; Resp 18; Pulse Ox 99% on R/A;                                    ph  

13:42  / 65; Pulse 89; Resp 18; Pulse Ox 99% on R/A;                                    ph  

15:19  / 78; Pulse 82; Resp 18; Temp 97.5; Pulse Ox 99% on R/A;                         ph  

10:24 Body Mass Index 26.43 (69.85 kg, 162.56 cm)                                             aj  

                                                                                                  

ED Course:                                                                                        

10:17 Patient arrived in ED.                                                                  tw3 

10:24 Triage completed.                                                                       aj  

10:24 Arm band placed on left wrist.                                                          aj  

10:34 Benny Durham MD is Attending Physician.                                             ps1 

10:50 Initial lab(s) drawn, by me, sent to lab. Inserted saline lock: 20 gauge in left        ph  

      antecubital area, using aseptic technique. Blood collected.                                 

10:53 EKG done, by EKG tech. reviewed by Benny Durham MD.                                   at1 

11:16 Laverne Bethea, RN is Primary Nurse.                                                    ph  

11:35 Patient maintains SpO2 saturation greater than 95% on room air.                         ph  

11:36 Patient has correct armband on for positive identification. Placed in gown. Bed in low  ph  

      position. Call light in reach. Side rails up X2. Cardiac monitor on. Pulse ox on. NIBP      

      on. Door closed. Noise minimized. Warm blanket given. Pillow given. Head of bed             

      elevated.                                                                                   

11:45 X-ray completed. Portable x-ray completed in exam room. Patient tolerated procedure     jb2 

      well.                                                                                       

13:07 XRAY Chest (1 view) In Process Unspecified.                                             EDMS

13:20 Rene Minor MD is Referral Physician.                                                ps1 

13:26 Zion Estes DO is Hospitalizing Provider.                                           ps1 

13:39 No provider procedures requiring assistance completed. Patient admitted, IV remains in  ph  

      place.                                                                                      

                                                                                                  

Administered Medications:                                                                         

11:03 Drug: Zofran 4 mg Route: IVP; Site: left antecubital;                                   ph  

14:20 Follow up: Response: No adverse reaction                                                ph  

11:05 Drug: morphine 4 mg Route: IVP; Site: left antecubital;                                 ph  

11:20 Follow up: Response: No adverse reaction; Pain is decreased                             ph  

13:32 Drug: GI Cocktail without Donnatal - (Maalox Suspension 30 ml, Lidocaine Liquid 2 % 15  ph  

      ml) Route: PO;                                                                              

14:19 Follow up: Response: No adverse reaction                                                ph  

                                                                                                  

                                                                                                  

Outcome:                                                                                          

13:21 Discharge ordered by MD.                                                                ps1 

13:26 Decision to Hospitalize by Provider.                                                    ps1 

16:18 Admitted to Tele accompanied by tech, via wheelchair, room 224, with chart, Report      sg  

      called to  Tatyana LUIS                                                                       

16:18 Condition: stable                                                                           

16:18 Instructed on the need for admit, safety practices, Demonstrated understanding of           

      instructions.                                                                               

16:18 Patient left the ED.                                                                    sg  

                                                                                                  

Signatures:                                                                                       

Dispatcher [employer]                           EDMS                                                 

Deepak Horan RN RN sg Myers, Amanda, RN RN aj Buechter, Jesse                              jb2                                                  

Laurie Singh, EKG Tech              EKG Tat1                                                  

Laverne Bethea RN RN ph Wade, Tia                                    tw3                                                  

Benny Durham MD MD   ps1                                                  

                                                                                                  

**************************************************************************************************

## 2019-06-10 NOTE — P.HP
Certification for Inpatient


Patient admitted to: Observation


With expected LOS: <2 Midnights


Patient will require the following post-hospital care: None


Practitioner: I am a practitioner with admitting privileges, knowledge of 

patient current condition, hospital course, and medical plan of care.


Services: Services provided to patient in accordance with Admission 

requirements found in Title 42 Section 412.3 of the Code of Federal Regulations





Patient History


Date of Service: 06/10/19


Primary Care Provider: none; Endocrine-Dr. Ruelas


Reason for admission: Chest pain


History of Present Illness: 





56-year-old  female presented to the emergency room with substernal 

chest pain.





Patient reported substernal chest pain.  It radiated to the top of her abdomen 

bilateral.  It was associated with some nausea and shortness of breath.  No 

vomiting noted.  She rated the pain about a 10/10.  She thought it was heartburn

, took medication without relief.  Patient with history of diabetes and tobacco 

use.  The patient came to the ER for further evaluation.





In the ER patient evaluated.  Blood pressure slightly elevated.  EKG showed 

some ST depression to the lateral leads.  Chest x-ray unremarkable.  Initial 

troponin unremarkable.  BNP at 667. Sodium 136, potassium 3.9, BUN of 0.9 with 

a GFR 62.  Patient was admitted for further evaluation.  Heart score-4





When I saw the patient ER, she was without any significant chest pain.  She was 

given morphine in the emergency room.  Patient still smokes about a pack per 

day.  She is trying to quit.  She takes medication for diabetes.  No family 

history of heart disease.


Home medications list reviewed: Yes





- Past Medical/Surgical History


Diabetic: Yes


-: Diabetes mellitus type 2


-: Tobacco use


-: Hysterectomy


Psychosocial/ Personal History: Patient lives at home.





- Family History


Family History: Reviewed- Non-Contributory





- Social History


Smoking Status: Heavy Tobacco smoker (>10 cigarettes/day)


Counseled patient to stop smoking for: less than 10 minutes


Smoking therapy provided: Yes


Patient receptive to therapy: Yes


Alcohol use: No


CD- Drugs: No


Caffeine use: Yes


Place of Residence: Home





Review of Systems


General: As per HPI


Eyes: Unremarkable


ENT: Unremarkable


Respiratory: Shortness of Breath, As per HPI


Cardiovascular: Chest Pain, As per HPI


Gastrointestinal: Nausea, As per HPI


Genitourinary: Unremarkable


Musculoskeletal: Unremarkable


Integumentary: Unremarkable


Neurological: Unremarkable


Lymphatics: Unremarkable





Physical Examination





- Physical Exam


General: Alert, In no apparent distress, Oriented x3, Cooperative


HEENT: Atraumatic, Normocephalic, PERRLA, Mucous membr. moist/pink


Neck: Supple, No Thyromegaly


Respiratory: Clear to auscultation bilaterally


Cardiovascular: Normal pulses, Regular rate/rhythm


Gastrointestinal: Normal bowel sounds, Soft and benign, Non-distended, No 

tenderness, No masses, No rebound, No guarding


Musculoskeletal: No contractures, No erythema, No tenderness, No warmth


Integumentary: No tenderness/swelling, No erythema, No warmth, No cyanosis


Neurological: Normal speech, Normal strength at 5/5 x4 extr, Normal tone, 

Normal affect





- Studies


Laboratory Data (last 24 hrs)





06/10/19 10:53: Sodium 136, Potassium 3.9, BUN 15, Creatinine 0.93, Glucose 260 

H, Magnesium 1.9, Total Bilirubin 0.3, AST 33, ALT 62, Alkaline Phosphatase 124 

H


06/10/19 10:53: WBC 11.9 H, Hgb 15.8 H, Hct 46.3 H, Plt Count 325








Assessment and Plan





- Plan





Impression:


Chest pain with risk factors for heart disease


Diabetes mellitus type 2


Hypertension


Tobacco abuse





Plan:


Chest pain with risk factors for heart disease:  Patient will be admitted for 

further evaluation.  Will monitor cardiac enzymes and telemetry.  Will order 

echocardiogram to further evaluate.  Cardiology has been consulted to further 

evaluate.  Await recommendations.  Will keep the patient NPO after midnight as 

the patient may require cardiac evaluation.  Will start aspirin, DVT prophylaxis

-Lovenox and Lipitor.  Will start metoprolol for blood pressure control.  

Continue to reassess.  If workup unremarkable patient may be able to be 

discharged as early as tomorrow.  Patient remains in observation.


Diabetes mellitus type 2:  Will provide insulin sliding scale.  Will monitor 

closely.  Will hold metformin at this time.


Hypertension:  Blood pressure slightly elevated.  Will start metoprolol.


Tobacco abuse:  Will continue with tobacco cessation education.  Will provide 

nicotine patch as needed.


Discharge Plan: Home


Plan to discharge in: 24 Hours





- Advance Directives


Does patient have a Living Will: No


Does patient have a Durable POA for Healthcare: No





- Code Status/Comfort Care


Code Status Assessed: Yes (Patient full code.)


Time Spent Managing Pts Care (In Minutes): 55

## 2019-06-10 NOTE — XMS REPORT
Patient Summary Document

 Created on:Cydney 10, 2019



Patient:BREANNA MONROE

Sex:Female

:1963

External Reference #:[id number]





Demographics







 Address  [address]



   [place] [address]

 

 Home Phone  [phone]

 

 Email Address  NONE

 

 Preferred Language  Unknown

 

 Marital Status  Unknown

 

 Christianity Affiliation  Unknown

 

 Race  Unknown

 

 Additional Race(s)  Unavailable

 

 Ethnic Group  Unknown









Author







 Organization  [facility]

 

 Address  [address]



   [place] [address]

 

 Phone  [phone]









Care Team Providers







 Name  Role  Phone

 

 Unavailable  Unavailable  Unavailable









Problems

This patient has no known problems.



Allergies, Adverse Reactions, Alerts

This patient has no known allergies or adverse reactions.



Medications

This patient has no known medications.

## 2019-06-11 NOTE — ECHO
HEIGHT: 5 ft 4 in   WEIGHT: 166 lb 6 oz   DATE OF STUDY: 06/11/2019   REFER DR: Zion Estes

2-DIMENSIONAL: YES

     M.MODE: YES

 DOPPLER: YES

COLOR FLOW: YES



                    TDS:  NO

PORTABLE: NO

 DEFINITY:  NO

BUBBLE STUDY: NO





DIAGNOSIS:  CHEST PAIN 



CARDIAC HISTORY:  

CATHERIZATION: NO

SURGERY: NO

PROSTHETIC VALVE: NO

PACEMAKER: NO





MEASUREMENTS (cm)

    DIASTOLIC (NORMALS)                 SYSTOLIC (NORMALS)

IVSd                 1.3 (0.6-1.2)                    LA Diam 3.3 (1.9-4.0)     LVEF       
  65%  

LVIDd               3.4 (3.5-5.7)                        LVIDs      2.2 (2.0-3.5)     %FS  
        35%

LVPWd             1.2 (0.6-1.2)

Ao Diam           2.6 (2.0-3.7)



2 DIMENSIONAL ASSESSMENT:

RIGHT ATRIUM:                   NORMAL

LEFT ATRIUM:       NORMAL



RIGHT VENTRICLE:            NORMAL

LEFT VENTRICLE: NORMAL



TRICUSPID VALVE:             NORMAL

MITRAL VALVE:     NORMAL



PULMONIC VALVE:             NORMAL

AORTIC VALVE:     NORMAL



PERICARDIAL EFFUSION: NONE

AORTIC ROOT:      NORMAL





LEFT VENTRICULAR WALL MOTION:     NORMAL.



DOPPLER/COLOR FLOW:     NORMAL.



COMMENTS:      NORMAL 2D ECHOCARDIOGRAM WITH DOPPLER. NO WALL MOTION ABNORMALITIES. NO 
EFFUSION. 



TECHNOLOGIST:   CHAVEZ LONG

## 2019-06-11 NOTE — P.DS
Admission Date: 06/10/19


Discharge Date: 06/11/19


Primary Care Provider: none; Endocrine-Dr. Ruelas


Disposition: ROUTINE DISCHARGE


Discharge Condition: GOOD


Reason for Admission: Chest pain


Consultations: 





Cardiology-Dr. Minor/Jason


Procedures: 





ECHO:


Obtained. Follow up results with Cardiology.





Medical Problem List: 


Chest pain with risk factors for heart disease


Diabetes mellitus type 2


Hypertension


Hyperlipidemia


GERD


Tobacco abuse





Brief History of Present Illness: 





56-year-old  female presented to the emergency room with substernal 

chest pain.





Patient reported substernal chest pain.  It radiated to the top of her abdomen 

bilateral.  It was associated with some nausea and shortness of breath.  No 

vomiting noted.  She rated the pain about a 10/10.  She thought it was heartburn

, took medication without relief.  Patient with history of diabetes and tobacco 

use.  The patient came to the ER for further evaluation.





In the ER patient evaluated.  Blood pressure slightly elevated.  EKG showed 

some ST depression to the lateral leads.  Chest x-ray unremarkable.  Initial 

troponin unremarkable.  BNP at 667. Sodium 136, potassium 3.9, BUN of 0.9 with 

a GFR 62.  Patient was admitted for further evaluation.  Heart score-4





When I saw the patient ER, she was without any significant chest pain.  She was 

given morphine in the emergency room.  Patient still smokes about a pack per 

day.  She is trying to quit.  She takes medication for diabetes.  No family 

history of heart disease.


Hospital Course: 








Patient presented with chest pain. Patient was observed.  Cardiac enzymes 

remained unremarkable.  Chest pain resolved.  No significant EKG changes noted.

  Echocardiogram obtained.  Patient was seen and evaluated by Cardiology.  No 

intervention was required at this time.  Chest pain may be GERD related.  

Patient reported improvement of symptoms with Pepcid.  At discharge she will 

continue with Pepcid 20 mg 1 pill twice daily.  Patient may benefit with GI 

evaluation as an outpatient including EGD if symptoms persist.  Cardiology 

recommends further cardiac evaluation with outpatient cardiac stress test due 

to her risk factors.  Patient will follow up with cardiology within 1-2 weeks 

to arrange further.  Patient may continue with aspirin 81 mg daily.





Patient with diabetes mellitus type 2. A1c 7.6.  Blood sugar remained stable.  

At discharge she will continue with metformin 1000 mg 1 pill twice daily.  New 

medication-Tradjenta 5 mg daily has been started for better diabetic control.  

Recommendation is to maintain blood sugars less 140 fasting and less than 200 

after meals.  Further adjustment can be done by her PCP and/or endocrinology.  

Recommendation is to follow up with Endocrinology in 1-2 weeks to follow up 

this hospitalization and to continue her care.





Patient with hypertension.  Patient not previously on medication.  Patient has 

done well on metoprolol.  At discharge she will continue with metoprolol 12.5 

mg 1 pill twice daily.  Recommendation is to maintain blood pressures less 150/

80.  Further adjustment can be done by her PCP.  Medication may need to be held 

if blood pressure systolic less than 100 or heart rate less than 60.





Patient with hyperlipidemia.  . Due to her risk factors patient was 

started on medication.  At discharge she will continue with Lipitor 10 mg 

daily.  Further adjustment and monitoring can be done by her PCP.





Tobacco cessation education will be provided.





Vital Signs/Physical Exam: 














Temp Pulse Resp BP Pulse Ox


 


 97.4 F   94 H  20   128/62   96 


 


 06/11/19 08:00  06/11/19 08:00  06/11/19 08:00  06/11/19 08:00  06/11/19 08:00








General: Alert, In no apparent distress, Oriented x3, Cooperative


HEENT: Atraumatic, Mucous membr. moist/pink


Neck: Supple, No Thyromegaly


Respiratory: Clear to auscultation bilaterally, Normal air movement


Cardiovascular: Normal pulses, Regular rate/rhythm


Gastrointestinal: Normal bowel sounds, Soft and benign, Non-distended, No 

tenderness, No masses, No rebound, No guarding


Musculoskeletal: No erythema, No tenderness, No warmth


Integumentary: No tenderness/swelling, No erythema, No warmth, No cyanosis


Neurological: Normal speech, Normal strength at 5/5 x4 extr, Normal tone, 

Normal affect


Laboratory Data at Discharge: 














WBC  10.2 K/uL (4.3-10.9)  D 06/11/19  05:54    


 


Hgb  15.1 g/dL (12.0-15.0)  H  06/11/19  05:54    


 


Hct  43.3 % (36.0-45.0)   06/11/19  05:54    


 


Plt Count  298 K/uL (152-406)   06/11/19  05:54    


 


Sodium  139 mmol/L (136-145)   06/11/19  05:54    


 


Potassium  4.4 mmol/L (3.5-5.1)   06/11/19  05:54    


 


BUN  11 mg/dL (7-18)   06/11/19  05:54    


 


Creatinine  0.86 mg/dL (0.55-1.3)   06/11/19  05:54    


 


Glucose  158 mg/dL ()  H  06/11/19  05:54    


 


Magnesium  2.2 mg/dL (1.8-2.4)   06/11/19  05:54    


 


Total Bilirubin  0.3 mg/dL (0.2-1.0)   06/10/19  10:53    


 


AST  33 U/L (15-37)   06/10/19  10:53    


 


ALT  62 U/L (12-78)   06/10/19  10:53    


 


Alkaline Phosphatase  124 U/L ()  H  06/10/19  10:53    


 


Troponin I  < 0.02 ng/mL (0.0-0.045)   06/11/19  05:54    


 


Triglycerides  152 mg/dL (<150)  H  06/11/19  05:54    


 


Cholesterol  171 mg/dL (<200)   06/11/19  05:54    


 


HDL Cholesterol  29 mg/dL (40-60)  L  06/11/19  05:54    


 


Cholesterol/HDL Ratio  5.90   06/11/19  05:54    








Home Medications: 








Aspirin [Aspirin EC 81 MG] 81 mg PO DAILY #90 tablet. 06/11/19 


Atorvastatin Calcium [Lipitor] 10 mg PO BEDTIME #30 tab 06/11/19 


Famotidine [Pepcid*] 20 mg PO BID #60 tab 06/11/19 


Linagliptin [Tradjenta] 5 mg PO DAILY #30 tablet 06/11/19 


Metformin ER [Glucophage ER*] 1,000 mg PO BID #60 tab.sa 06/11/19 


Metoprolol Tartrate [Lopressor*] 12.5 mg PO BID 6AM 6PM #60 tab 06/11/19 





New Medications: 


Aspirin [Aspirin EC 81 MG] 81 mg PO DAILY #90 tablet.


Atorvastatin Calcium [Lipitor] 10 mg PO BEDTIME #30 tab


Famotidine [Pepcid*] 20 mg PO BID #60 tab


Linagliptin [Tradjenta] 5 mg PO DAILY #30 tablet


Metformin ER [Glucophage ER*] 1,000 mg PO BID #60 tab.sa


Metoprolol Tartrate [Lopressor*] 12.5 mg PO BID 6AM 6PM #60 tab


Patient Discharge Instructions: 1.  Patient will need a follow with a PCP in 1 

week to follow up this hospitalization.  2.  Patient presented with chest pain. 

Patient was observed.  Cardiac enzymes remained unremarkable.  Chest pain 

resolved.  No significant EKG changes noted.  Echocardiogram obtained.  Patient 

was seen and evaluated by Cardiology.  No intervention was required at this 

time.  Chest pain may be GERD related.  Patient reported improvement of 

symptoms with Pepcid.  At discharge she will continue with Pepcid 20 mg 1 pill 

twice daily.  Patient may benefit with GI evaluation as an outpatient including 

EGD if symptoms persist.  Cardiology recommends further cardiac evaluation with 

outpatient cardiac stress test due to her risk factors.  Patient will follow up 

with cardiology within 1-2 weeks to arrange further.  Patient may continue with 

aspirin 81 mg daily.  3.  Patient with diabetes mellitus type 2. A1c 7.6.  

Blood sugar remained stable.  At discharge she will continue with metformin 

1000 mg 1 pill twice daily.  New medication-Tradjenta 5 mg daily has been 

started for better diabetic control.  Recommendation is to maintain blood 

sugars less 140 fasting and less than 200 after meals.  Further adjustment can 

be done by her PCP and/or endocrinology.  Recommendation is to follow up with 

Endocrinology in 1-2 weeks to follow up this hospitalization and to continue 

her care.  4.  Patient with hypertension.  Patient not previously on 

medication.  Patient has done well on metoprolol.  At discharge she will 

continue with metoprolol 12.5 mg 1 pill twice daily.  Recommendation is to 

maintain blood pressures less 150/80.  Further adjustment can be done by her 

PCP.  Medication may need to be held if blood pressure systolic less than 100 

or heart rate less than 60.  5.  Patient with hyperlipidemia.  . Due to 

her risk factors patient was started on medication.  At discharge she will 

continue with Lipitor 10 mg daily.  Further adjustment and monitoring can be 

done by her PCP.  6.  Tobacco cessation education will be provided.


Diet: AHA


Activity: Ad ximena


Time spent managing pt's care (in minutes): 55

## 2019-06-11 NOTE — CON
Ms. Grace is a 56-year-old woman admitted to Dr. Estes service on 06/10/2019.  I saw her on 06/1
1/2019.



Reason For Consultation:  Chest pain.



History Of Present Illness:  Ms. Grace is a 56-year-old woman with multiple cardiac risk factors 
including diabetes, hypertension, tobacco abuse.  She only takes metformin at home from a medication 
standpoint.  Has never had any cardiac issues, but came in with chest pain and shortness of breath.  
Her pain did not radiate.  No nausea, vomiting, diaphoresis, PND, orthopnea, pedal edema, palpitation
s, or syncope.  Chest x-ray was normal.  EKG showed nonspecific changes.  Troponin was negative.  Her
 symptoms were not exertional and not radiating and felt blood pressure could have been going on for 
about 2 days.



Allergies:  NONE.



Review of Systems:

Negative.



Social History:  Positive for tobacco.



Family History:  Negative.



Medications:  At home include metformin.



Physical Examination:

Vital Signs:  Stable.  She was afebrile. 

HEENT:  Negative. 

Neck:  Supple without any bruit, lymphadenopathy, JVD, or thyromegaly. 

Chest:  Clear to auscultation and percussion. 

Cardiac:  Revealed a regular rhythm and rate.  No murmurs, gallops, or rubs. 

Abdomen:  Benign. 

Extremities:  Revealed no clubbing, cyanosis, or edema.



Diagnostic Data:  All normal.



Impression And Plan:  Atypical chest pain and possibly gastroesophageal reflux disease in nature.  Br
eathing may be secondary to chronic obstructive pulmonary disease and tobacco use.  Echocardiogram wh
at was done was normal.  CPKs, MBs, and troponin are negative.  I feel comfortable with her going jagdeep
e.  I will set her up to have an outpatient Lexiscan in the near future.  Her blood pressure and diab
etes are well controlled.





NB/MODL

DD:  06/11/2019 17:39:18Voice ID:  [id number]

DT:  06/11/2019 23:19:06Report ID:  [id number]